# Patient Record
Sex: MALE | URBAN - METROPOLITAN AREA
[De-identification: names, ages, dates, MRNs, and addresses within clinical notes are randomized per-mention and may not be internally consistent; named-entity substitution may affect disease eponyms.]

---

## 2023-07-08 ENCOUNTER — APPOINTMENT (RX ONLY)
Dept: URBAN - METROPOLITAN AREA CLINIC 93 | Facility: CLINIC | Age: 33
Setting detail: DERMATOLOGY
End: 2023-07-08

## 2023-07-08 DIAGNOSIS — L81.9 DISORDER OF PIGMENTATION, UNSPECIFIED: ICD-10-CM | Status: INADEQUATELY CONTROLLED

## 2023-07-08 DIAGNOSIS — A60.9 ANOGENITAL HERPESVIRAL INFECTION, UNSPECIFIED: ICD-10-CM | Status: INADEQUATELY CONTROLLED

## 2023-07-08 PROCEDURE — ? PRESCRIPTION MEDICATION MANAGEMENT

## 2023-07-08 PROCEDURE — ? ADDITIONAL NOTES

## 2023-07-08 PROCEDURE — ? PRESCRIPTION

## 2023-07-08 PROCEDURE — ? COUNSELING

## 2023-07-08 PROCEDURE — 99214 OFFICE O/P EST MOD 30 MIN: CPT

## 2023-07-08 RX ORDER — CLOBETASOL PROPIONATE 0.5 MG/G
OINTMENT TOPICAL
Qty: 60 | Refills: 0 | Status: ERX | COMMUNITY
Start: 2023-07-08

## 2023-07-08 RX ORDER — PREDNISOLONE 5 MG/1
TABLET ORAL
Qty: 21 | Refills: 0 | Status: ERX

## 2023-07-08 RX ORDER — VALACYCLOVIR HYDROCHLORIDE 500 MG/1
TABLET, FILM COATED ORAL
Qty: 30 | Refills: 0 | Status: ERX | COMMUNITY
Start: 2023-07-08

## 2023-07-08 RX ADMIN — CLOBETASOL PROPIONATE 1: 0.5 OINTMENT TOPICAL at 00:00

## 2023-07-08 RX ADMIN — VALACYCLOVIR HYDROCHLORIDE 1: 500 TABLET, FILM COATED ORAL at 00:00

## 2023-07-08 ASSESSMENT — LOCATION SIMPLE DESCRIPTION DERM
LOCATION SIMPLE: PENIS
LOCATION SIMPLE: RIGHT KNEE
LOCATION SIMPLE: LEFT KNEE

## 2023-07-08 ASSESSMENT — LOCATION ZONE DERM
LOCATION ZONE: LEG
LOCATION ZONE: PENIS

## 2023-07-08 ASSESSMENT — LOCATION DETAILED DESCRIPTION DERM
LOCATION DETAILED: DORSAL GLANS
LOCATION DETAILED: LEFT KNEE
LOCATION DETAILED: RIGHT KNEE
LOCATION DETAILED: RIGHT DORSAL SHAFT OF PENIS

## 2023-07-08 NOTE — PROCEDURE: PRESCRIPTION MEDICATION MANAGEMENT
Detail Level: Detailed
Render In Strict Bullet Format?: No
Plan: [place] Kenalog  in reserve.
Initiate Treatment: Clobetasol 0.05% cream QAM\\nAmlactin lotion/cream  QD-BID

## 2023-07-08 NOTE — PROCEDURE: ADDITIONAL NOTES
Additional Notes: Labs are on hold due to patient stating he just them done. He will call pcp to send labs over. Once we get the results we will call the additional Rx for Valtrex 500 mg PO QD for maintenance.
Detail Level: Simple
Render Risk Assessment In Note?: yes

## 2023-09-14 ENCOUNTER — RX ONLY (OUTPATIENT)
Age: 33
Setting detail: RX ONLY
End: 2023-09-14

## 2023-09-14 RX ORDER — VALACYCLOVIR HYDROCHLORIDE 500 MG/1
TABLET, FILM COATED ORAL
Qty: 30 | Refills: 6 | Status: ERX

## 2023-10-14 ENCOUNTER — APPOINTMENT (RX ONLY)
Dept: URBAN - METROPOLITAN AREA CLINIC 93 | Facility: CLINIC | Age: 33
Setting detail: DERMATOLOGY
End: 2023-10-14

## 2023-10-14 DIAGNOSIS — A60.9 ANOGENITAL HERPESVIRAL INFECTION, UNSPECIFIED: ICD-10-CM

## 2023-10-14 DIAGNOSIS — L81.9 DISORDER OF PIGMENTATION, UNSPECIFIED: ICD-10-CM

## 2023-10-14 DIAGNOSIS — L73.0 ACNE KELOID: ICD-10-CM

## 2023-10-14 PROCEDURE — ? PRESCRIPTION

## 2023-10-14 PROCEDURE — 99214 OFFICE O/P EST MOD 30 MIN: CPT

## 2023-10-14 PROCEDURE — ? COUNSELING

## 2023-10-14 RX ORDER — VALACYCLOVIR HYDROCHLORIDE 500 MG/1
TABLET, FILM COATED ORAL
Qty: 30 | Refills: 6 | Status: ERX

## 2023-10-14 RX ORDER — TRIAMCINOLONE ACETONIDE 1 MG/G
CREAM TOPICAL
Qty: 80 | Refills: 2 | Status: ERX | COMMUNITY
Start: 2023-10-14

## 2023-10-14 RX ORDER — CLOBETASOL PROPIONATE 0.5 MG/G
OINTMENT TOPICAL
Qty: 60 | Refills: 0 | Status: ERX

## 2023-10-14 RX ADMIN — TRIAMCINOLONE ACETONIDE: 1 CREAM TOPICAL at 00:00

## 2023-10-14 ASSESSMENT — LOCATION SIMPLE DESCRIPTION DERM
LOCATION SIMPLE: POSTERIOR NECK
LOCATION SIMPLE: SUPERIOR FOREHEAD
LOCATION SIMPLE: RIGHT KNEE
LOCATION SIMPLE: PENIS
LOCATION SIMPLE: LEFT KNEE

## 2023-10-14 ASSESSMENT — LOCATION DETAILED DESCRIPTION DERM
LOCATION DETAILED: SUPERIOR MID FOREHEAD
LOCATION DETAILED: RIGHT KNEE
LOCATION DETAILED: LEFT KNEE
LOCATION DETAILED: MID POSTERIOR NECK
LOCATION DETAILED: DORSAL GLANS

## 2023-10-14 ASSESSMENT — LOCATION ZONE DERM
LOCATION ZONE: FACE
LOCATION ZONE: PENIS
LOCATION ZONE: LEG
LOCATION ZONE: NECK

## 2024-01-04 ENCOUNTER — RX ONLY (OUTPATIENT)
Age: 34
Setting detail: RX ONLY
End: 2024-01-04

## 2024-01-04 ENCOUNTER — APPOINTMENT (RX ONLY)
Dept: URBAN - METROPOLITAN AREA CLINIC 93 | Facility: CLINIC | Age: 34
Setting detail: DERMATOLOGY
End: 2024-01-04

## 2024-01-04 DIAGNOSIS — L28.0 LICHEN SIMPLEX CHRONICUS: ICD-10-CM | Status: IMPROVED

## 2024-01-04 DIAGNOSIS — B35.4 TINEA CORPORIS: ICD-10-CM | Status: WORSENING

## 2024-01-04 PROCEDURE — ? PRESCRIPTION MEDICATION MANAGEMENT

## 2024-01-04 PROCEDURE — 99214 OFFICE O/P EST MOD 30 MIN: CPT

## 2024-01-04 PROCEDURE — ? PRESCRIPTION

## 2024-01-04 PROCEDURE — ? COUNSELING

## 2024-01-04 RX ORDER — TERBINAFINE HYDROCHLORIDE 250 MG/1
TABLET ORAL
Qty: 10 | Refills: 0 | Status: ERX

## 2024-01-04 RX ORDER — ECONAZOLE NITRATE 10 MG/G
CREAM TOPICAL BID
Qty: 85 | Refills: 1 | Status: ERX | COMMUNITY
Start: 2024-01-04

## 2024-01-04 RX ORDER — TERBINAFINE HYDROCHLORIDE 250 MG/1
TABLET ORAL
Qty: 20 | Refills: 0 | Status: CANCELLED

## 2024-01-04 RX ADMIN — ECONAZOLE NITRATE: 10 CREAM TOPICAL at 00:00

## 2024-01-04 ASSESSMENT — LOCATION DETAILED DESCRIPTION DERM
LOCATION DETAILED: RIGHT KNEE
LOCATION DETAILED: LEFT PROXIMAL CALF
LOCATION DETAILED: RIGHT PROXIMAL CALF
LOCATION DETAILED: LEFT KNEE

## 2024-01-04 ASSESSMENT — LOCATION SIMPLE DESCRIPTION DERM
LOCATION SIMPLE: LEFT KNEE
LOCATION SIMPLE: RIGHT CALF
LOCATION SIMPLE: LEFT CALF
LOCATION SIMPLE: RIGHT KNEE

## 2024-01-04 ASSESSMENT — BSA RASH: BSA RASH: 2

## 2024-01-04 ASSESSMENT — SEVERITY ASSESSMENT: SEVERITY: MILD

## 2024-01-04 ASSESSMENT — LOCATION ZONE DERM: LOCATION ZONE: LEG

## 2024-01-04 NOTE — HPI: INFECTION (TINEA)
Is This A New Presentation, Or A Follow-Up?: Rash
Additional History: Has tried otc antifungal cream.

## 2024-01-04 NOTE — PROCEDURE: PRESCRIPTION MEDICATION MANAGEMENT
Render In Strict Bullet Format?: No
Continue Regimen: Clobetasol ointment twice daily x2 weeks, then switch to Monday-Friday only at night time
Detail Level: Simple

## 2024-03-09 ENCOUNTER — APPOINTMENT (RX ONLY)
Dept: URBAN - METROPOLITAN AREA CLINIC 93 | Facility: CLINIC | Age: 34
Setting detail: DERMATOLOGY
End: 2024-03-09

## 2024-03-09 DIAGNOSIS — L28.0 LICHEN SIMPLEX CHRONICUS: ICD-10-CM

## 2024-03-09 DIAGNOSIS — L21.8 OTHER SEBORRHEIC DERMATITIS: ICD-10-CM

## 2024-03-09 DIAGNOSIS — B35.4 TINEA CORPORIS: ICD-10-CM

## 2024-03-09 DIAGNOSIS — L73.0 ACNE KELOID: ICD-10-CM

## 2024-03-09 PROCEDURE — ? PRESCRIPTION

## 2024-03-09 PROCEDURE — ? COUNSELING

## 2024-03-09 PROCEDURE — ? PRESCRIPTION MEDICATION MANAGEMENT

## 2024-03-09 PROCEDURE — 99214 OFFICE O/P EST MOD 30 MIN: CPT

## 2024-03-09 RX ORDER — CLINDAMYCIN PHOSPHATE 10 MG/ML
SOLUTION TOPICAL
Qty: 60 | Refills: 5 | Status: ERX | COMMUNITY
Start: 2024-03-09

## 2024-03-09 RX ADMIN — CLINDAMYCIN PHOSPHATE: 10 SOLUTION TOPICAL at 00:00

## 2024-03-09 ASSESSMENT — LOCATION DETAILED DESCRIPTION DERM
LOCATION DETAILED: LEFT DISTAL CALF
LOCATION DETAILED: LEFT KNEE
LOCATION DETAILED: LEFT DORSAL GREAT TOE
LOCATION DETAILED: RIGHT KNEE
LOCATION DETAILED: MEDIAL FRONTAL SCALP
LOCATION DETAILED: RIGHT DORSAL GREAT TOE
LOCATION DETAILED: MID-OCCIPITAL SCALP

## 2024-03-09 ASSESSMENT — LOCATION SIMPLE DESCRIPTION DERM
LOCATION SIMPLE: POSTERIOR SCALP
LOCATION SIMPLE: LEFT CALF
LOCATION SIMPLE: LEFT KNEE
LOCATION SIMPLE: RIGHT KNEE
LOCATION SIMPLE: FRONTAL SCALP
LOCATION SIMPLE: RIGHT GREAT TOE
LOCATION SIMPLE: LEFT GREAT TOE

## 2024-03-09 ASSESSMENT — LOCATION ZONE DERM
LOCATION ZONE: TOE
LOCATION ZONE: LEG
LOCATION ZONE: SCALP
LOCATION ZONE: LEG

## 2024-03-09 NOTE — PROCEDURE: PRESCRIPTION MEDICATION MANAGEMENT
Continue Regimen: Clobetasol qhs
Render In Strict Bullet Format?: No
Detail Level: Simple
Continue Regimen: Econazole cream bid
Initiate Treatment: Triamcinolone cream bid PRN